# Patient Record
Sex: MALE | Race: OTHER | URBAN - NONMETROPOLITAN AREA
[De-identification: names, ages, dates, MRNs, and addresses within clinical notes are randomized per-mention and may not be internally consistent; named-entity substitution may affect disease eponyms.]

---

## 2022-11-03 ENCOUNTER — OFFICE VISIT (OUTPATIENT)
Dept: PRIMARY CARE CLINIC | Age: 32
End: 2022-11-03

## 2022-11-03 VITALS
WEIGHT: 191 LBS | TEMPERATURE: 97.7 F | HEART RATE: 60 BPM | OXYGEN SATURATION: 98 % | SYSTOLIC BLOOD PRESSURE: 120 MMHG | DIASTOLIC BLOOD PRESSURE: 72 MMHG

## 2022-11-03 DIAGNOSIS — R11.0 NAUSEA: ICD-10-CM

## 2022-11-03 DIAGNOSIS — R68.89 FLU-LIKE SYMPTOMS: ICD-10-CM

## 2022-11-03 DIAGNOSIS — J10.1 INFLUENZA A: Primary | ICD-10-CM

## 2022-11-03 LAB
INFLUENZA A ANTIBODY: POSITIVE
INFLUENZA B ANTIBODY: NORMAL

## 2022-11-03 PROCEDURE — 99212 OFFICE O/P EST SF 10 MIN: CPT | Performed by: NURSE PRACTITIONER

## 2022-11-03 PROCEDURE — 87804 INFLUENZA ASSAY W/OPTIC: CPT | Performed by: NURSE PRACTITIONER

## 2022-11-03 ASSESSMENT — ENCOUNTER SYMPTOMS
EYES NEGATIVE: 1
SHORTNESS OF BREATH: 0
COUGH: 1
WHEEZING: 0
VOMITING: 0
DIARRHEA: 0
ALLERGIC/IMMUNOLOGIC NEGATIVE: 1
NAUSEA: 1
ABDOMINAL PAIN: 1

## 2022-11-03 NOTE — PROGRESS NOTES
Jesus Alberto Sanchez (:  1990) is a 29 y.o. male,New patient, here for evaluation of the following chief complaint(s):  Dizziness, Abdominal Pain, and Nausea    Patient presents today complaining of dizziness, abdominal pain, nausea, cough. His symptoms began . Patient does not speak [place] well. He uses [employer] , and has a close friend in office with him to also help translate. Discussed with patient he is positive for influenza A. Discussed over-the-counter medications he may take such as Mucinex, DayQuil or NyQuil, Motrin. Care instructions discussed. All questions answered. Patient verbalized understanding and agrees to plan of care. ASSESSMENT/PLAN:  1. Influenza A  2. Flu-like symptoms  -     POCT Influenza A/B  3. Nausea     No orders of the defined types were placed in this encounter. Return if symptoms worsen or fail to improve. Subjective   SUBJECTIVE/OBJECTIVE:  HPI    Review of Systems   Constitutional: Negative. HENT: Negative. Eyes: Negative. Respiratory:  Positive for cough. Negative for shortness of breath and wheezing. Cardiovascular: Negative. Gastrointestinal:  Positive for abdominal pain and nausea. Negative for diarrhea and vomiting. Endocrine: Negative. Genitourinary: Negative. Musculoskeletal: Negative. Skin: Negative. Allergic/Immunologic: Negative. Neurological:  Positive for dizziness. Hematological: Negative. Psychiatric/Behavioral: Negative. Objective   Physical Exam  Vitals reviewed. HENT:      Right Ear: Tympanic membrane, ear canal and external ear normal.      Left Ear: Tympanic membrane, ear canal and external ear normal.      Nose:      Right Sinus: No maxillary sinus tenderness or frontal sinus tenderness. Left Sinus: No maxillary sinus tenderness or frontal sinus tenderness. Mouth/Throat:      Lips: Pink.       Mouth: Mucous membranes are moist.      Pharynx: Posterior oropharyngeal erythema (postnasal drainage) present. No oropharyngeal exudate. Cardiovascular:      Rate and Rhythm: Normal rate and regular rhythm. Heart sounds: Normal heart sounds. Pulmonary:      Effort: Pulmonary effort is normal.      Breath sounds: Normal breath sounds. Lymphadenopathy:      Head:      Right side of head: No submandibular or tonsillar adenopathy. Left side of head: No submandibular or tonsillar adenopathy. Cervical:      Right cervical: No superficial cervical adenopathy. Left cervical: No superficial cervical adenopathy. Skin:     General: Skin is warm and dry. Neurological:      Mental Status: He is alert. Patient Instructions     You are positive for influenza A. Increase fluid intake. Hydrating fluids such as water or juice. Avoid Gatorade if having diarrhea. Rest.    Tylenol or Motrin for fever or pain as needed. You are contagious for at least 5 days. Change toothbrush after 5 days. You may use a coolmist humidifier in the bedroom to help moisten the which is helpful for your throat and sinuses. Start Claritin or Zyrtec daily to help with sinus drainage. Start Flonase nasal spray daily. May start Mucinex for chest congestion. An electronic signature was used to authenticate this note. --SANTY Hampton - CNP     EMR Dragon/translation disclaimer: Much of this encounter note is an electronic transcription/translation of spoken language to printed text. The electronic translation of spoken language may be erroneous, or at times, nonsensical words or phrases may be inadvertently transcribed.   Although I have reviewed the note for such errors, some may still exist.

## 2022-11-03 NOTE — PATIENT INSTRUCTIONS
You are positive for influenza A. Increase fluid intake. Hydrating fluids such as water or juice. Avoid Gatorade if having diarrhea. Rest.    Tylenol or Motrin for fever or pain as needed. You are contagious for at least 5 days. Change toothbrush after 5 days. You may use a coolmist humidifier in the bedroom to help moisten the which is helpful for your throat and sinuses. Start Claritin or Zyrtec daily to help with sinus drainage. Start Flonase nasal spray daily. May start Mucinex for chest congestion.